# Patient Record
Sex: MALE | Race: BLACK OR AFRICAN AMERICAN | Employment: FULL TIME | URBAN - METROPOLITAN AREA
[De-identification: names, ages, dates, MRNs, and addresses within clinical notes are randomized per-mention and may not be internally consistent; named-entity substitution may affect disease eponyms.]

---

## 2019-03-08 ENCOUNTER — APPOINTMENT (OUTPATIENT)
Dept: PHYSICAL THERAPY | Age: 54
End: 2019-03-08

## 2020-09-09 ENCOUNTER — HOSPITAL ENCOUNTER (OUTPATIENT)
Dept: PHYSICAL THERAPY | Age: 55
Discharge: HOME | End: 2020-09-09

## 2020-09-22 ENCOUNTER — APPOINTMENT (OUTPATIENT)
Dept: PHYSICAL THERAPY | Age: 55
End: 2020-09-22

## 2024-06-27 ENCOUNTER — OFFICE VISIT (OUTPATIENT)
Age: 59
End: 2024-06-27
Payer: COMMERCIAL

## 2024-06-27 VITALS
HEIGHT: 67 IN | BODY MASS INDEX: 35.47 KG/M2 | TEMPERATURE: 97.6 F | OXYGEN SATURATION: 94 % | SYSTOLIC BLOOD PRESSURE: 138 MMHG | HEART RATE: 60 BPM | DIASTOLIC BLOOD PRESSURE: 78 MMHG | WEIGHT: 226 LBS | RESPIRATION RATE: 18 BRPM

## 2024-06-27 DIAGNOSIS — I10 PRIMARY HYPERTENSION: Primary | ICD-10-CM

## 2024-06-27 DIAGNOSIS — M17.0 PRIMARY OSTEOARTHRITIS OF BOTH KNEES: ICD-10-CM

## 2024-06-27 DIAGNOSIS — R06.02 EXERTIONAL SHORTNESS OF BREATH: ICD-10-CM

## 2024-06-27 DIAGNOSIS — R94.31 ABNORMAL ECG: ICD-10-CM

## 2024-06-27 DIAGNOSIS — R07.89 OTHER CHEST PAIN: ICD-10-CM

## 2024-06-27 DIAGNOSIS — R01.1 SYSTOLIC MURMUR: ICD-10-CM

## 2024-06-27 DIAGNOSIS — I51.89 DIASTOLIC DYSFUNCTION: ICD-10-CM

## 2024-06-27 PROCEDURE — 99215 OFFICE O/P EST HI 40 MIN: CPT | Performed by: INTERNAL MEDICINE

## 2024-06-27 PROCEDURE — 3075F SYST BP GE 130 - 139MM HG: CPT | Performed by: INTERNAL MEDICINE

## 2024-06-27 PROCEDURE — 3078F DIAST BP <80 MM HG: CPT | Performed by: INTERNAL MEDICINE

## 2024-06-27 RX ORDER — METOPROLOL SUCCINATE 100 MG/1
100 TABLET, EXTENDED RELEASE ORAL DAILY
COMMUNITY

## 2024-06-27 RX ORDER — LANOLIN ALCOHOL/MO/W.PET/CERES
CREAM (GRAM) TOPICAL
COMMUNITY
Start: 2024-05-24

## 2024-06-27 RX ORDER — GABAPENTIN 300 MG/1
300 CAPSULE ORAL 2 TIMES DAILY
COMMUNITY
Start: 2024-05-22

## 2024-06-27 RX ORDER — METHOCARBAMOL 750 MG/1
750 TABLET, FILM COATED ORAL EVERY 4 HOURS PRN
COMMUNITY
Start: 2017-07-12

## 2024-06-27 RX ORDER — LOSARTAN POTASSIUM 100 MG/1
TABLET ORAL
COMMUNITY
Start: 2024-04-10

## 2024-06-27 RX ORDER — FENOFIBRATE 160 MG/1
160 TABLET ORAL DAILY
COMMUNITY

## 2024-06-27 RX ORDER — AMLODIPINE BESYLATE 10 MG/1
TABLET ORAL
COMMUNITY
Start: 2024-06-04

## 2024-06-27 RX ORDER — HYDROCHLOROTHIAZIDE 12.5 MG/1
12.5 TABLET ORAL DAILY
COMMUNITY
Start: 2024-05-18

## 2024-06-27 RX ORDER — FLUTICASONE PROPIONATE 50 MCG
2 SPRAY, SUSPENSION (ML) NASAL DAILY
COMMUNITY

## 2024-06-27 ASSESSMENT — ENCOUNTER SYMPTOMS
ALLERGIC/IMMUNOLOGIC NEGATIVE: 1
EYES NEGATIVE: 1
GASTROINTESTINAL NEGATIVE: 1
SHORTNESS OF BREATH: 0

## 2024-06-27 NOTE — PROGRESS NOTES
Jamie Gates Jr. (:  1965) is a 58 y.o. male,Established patient, here for evaluation of the following chief complaint(s):  Follow-up    Subjective   SUBJECTIVE/OBJECTIVE:  HPI  Patient presents today as a follow-up patient evaluation. He has a longstanding history of hypertension, which I was asked to evaluate his cardiac status. Patient states he was told in the past he had some type of valvular issue, but I do not have any record of that. Initially, when I started to ask him about symptoms, he stated that he had no chest discomfort nor shortness of breath but, as we started to discuss more, he does mention some shortness of breath with exertion at times, but minimal. He also has had some intermittent chest discomfort which was not severe.  This has not recurred over the last 6 months.  Patient does have bilateral knee pain but does not wish to be considered for surgery at this time.  I told him that his blood pressure readings could be a direct reflection of lack of physical activity, weight gain and sodium intake.  No syncope. No orthopnea. I reviewed his last echo performed in 2022 revealing no significant valvular disease and normal LV function. I was asked to evaluate his cardiac status and make recommendations.           I have carefully reviewed all available medical records, previous office notes, labs, x-rays, and procedure reports    No past medical history on file.     No past surgical history on file.     No Known Allergies     Current Outpatient Medications   Medication Sig Dispense Refill    amLODIPine (NORVASC) 10 MG tablet TAKE 1 TABLET BY MOUTH ONCE A DAY FOR 90 DAYS      vitamin B-12 (CYANOCOBALAMIN) 1000 MCG tablet TAKE 1 tablet Orally Once a day FOR 30 days      fenofibrate (TRIGLIDE) 160 MG tablet Take 1 tablet by mouth daily      fluticasone (FLONASE) 50 MCG/ACT nasal spray 2 sprays by Each Nostril route daily      gabapentin (NEURONTIN) 300 MG capsule Take 1 capsule by

## 2025-03-26 ENCOUNTER — OFFICE VISIT (OUTPATIENT)
Age: 60
End: 2025-03-26
Payer: COMMERCIAL

## 2025-03-26 VITALS
BODY MASS INDEX: 36.44 KG/M2 | WEIGHT: 232.2 LBS | DIASTOLIC BLOOD PRESSURE: 76 MMHG | TEMPERATURE: 97.3 F | SYSTOLIC BLOOD PRESSURE: 123 MMHG | OXYGEN SATURATION: 95 % | HEART RATE: 58 BPM | HEIGHT: 67 IN

## 2025-03-26 DIAGNOSIS — M17.0 PRIMARY OSTEOARTHRITIS OF BOTH KNEES: ICD-10-CM

## 2025-03-26 DIAGNOSIS — I51.89 DIASTOLIC DYSFUNCTION: ICD-10-CM

## 2025-03-26 DIAGNOSIS — I10 PRIMARY HYPERTENSION: Primary | ICD-10-CM

## 2025-03-26 DIAGNOSIS — R06.02 EXERTIONAL SHORTNESS OF BREATH: ICD-10-CM

## 2025-03-26 DIAGNOSIS — R01.1 SYSTOLIC MURMUR: ICD-10-CM

## 2025-03-26 DIAGNOSIS — R07.89 OTHER CHEST PAIN: ICD-10-CM

## 2025-03-26 DIAGNOSIS — R94.31 ABNORMAL ECG: ICD-10-CM

## 2025-03-26 PROCEDURE — 99215 OFFICE O/P EST HI 40 MIN: CPT | Performed by: INTERNAL MEDICINE

## 2025-03-26 PROCEDURE — 3078F DIAST BP <80 MM HG: CPT | Performed by: INTERNAL MEDICINE

## 2025-03-26 PROCEDURE — 93000 ELECTROCARDIOGRAM COMPLETE: CPT | Performed by: INTERNAL MEDICINE

## 2025-03-26 PROCEDURE — 3074F SYST BP LT 130 MM HG: CPT | Performed by: INTERNAL MEDICINE

## 2025-03-26 RX ORDER — LOSARTAN POTASSIUM AND HYDROCHLOROTHIAZIDE 12.5; 1 MG/1; MG/1
1 TABLET ORAL DAILY
COMMUNITY
End: 2025-03-26 | Stop reason: ALTCHOICE

## 2025-03-26 RX ORDER — METHOCARBAMOL 750 MG/1
TABLET ORAL
COMMUNITY

## 2025-03-26 RX ORDER — PSYLLIUM HUSK 3.4 G/7G
1 POWDER ORAL DAILY
COMMUNITY
Start: 2025-02-24

## 2025-03-26 RX ORDER — MECLIZINE HYDROCHLORIDE 25 MG/1
25 TABLET ORAL 3 TIMES DAILY PRN
COMMUNITY
Start: 2023-10-23

## 2025-03-26 RX ORDER — TRIAMCINOLONE ACETONIDE 1 MG/G
CREAM TOPICAL 2 TIMES DAILY
COMMUNITY

## 2025-03-26 ASSESSMENT — PATIENT HEALTH QUESTIONNAIRE - PHQ9
SUM OF ALL RESPONSES TO PHQ QUESTIONS 1-9: 0
SUM OF ALL RESPONSES TO PHQ QUESTIONS 1-9: 0
2. FEELING DOWN, DEPRESSED OR HOPELESS: NOT AT ALL
SUM OF ALL RESPONSES TO PHQ QUESTIONS 1-9: 0
SUM OF ALL RESPONSES TO PHQ QUESTIONS 1-9: 0
1. LITTLE INTEREST OR PLEASURE IN DOING THINGS: NOT AT ALL

## 2025-03-26 ASSESSMENT — ENCOUNTER SYMPTOMS
SHORTNESS OF BREATH: 1
ALLERGIC/IMMUNOLOGIC NEGATIVE: 1
GASTROINTESTINAL NEGATIVE: 1
EYES NEGATIVE: 1

## 2025-03-26 NOTE — PROGRESS NOTES
Jamie Gates Jr. (:  1965) is a 59 y.o. male,Established patient, here for evaluation of the following chief complaint(s):  Follow-up (9months)    Subjective   SUBJECTIVE/OBJECTIVE:  History of Present Illness  The patient presents for evaluation of blood pressure management. He has a longstanding history of hypertension, which I was asked to evaluate his cardiac status. Patient states he was told in the past he had some type of valvular issue, but I do not have any record of that. Patient does have bilateral knee pain but does not wish to be considered for surgery at this time.  I told him that his blood pressure readings could be a direct reflection of lack of physical activity, weight gain and sodium intake. I reviewed his last echo performed in 2022 revealing no significant valvular disease and normal LV function.    Intermittent episodes of lightheadedness and dizziness are reported, though these are not severe. Current medications include losartan and hydrochlorothiazide, administered as a combination therapy. Regular laboratory tests are conducted by his primary care physician to monitor health status. Blood pressure readings are noted to be stable.    An echocardiogram performed approximately three years ago revealed minimal thickening of the heart muscle, and a stress test conducted at the same time was normal. There is no current concern regarding chest pain, shortness of breath, or significant heart-related symptoms.    SOCIAL HISTORY  - Occupations: Works at a school  - Exercise: Engages in some physical activities  I have carefully reviewed all available medical records, previous office notes, lab, x-ray and procedure reports    History reviewed. No pertinent past medical history.     History reviewed. No pertinent surgical history.     Allergies   Allergen Reactions    Fexofenadine Hcl Dizziness or Vertigo        Current Outpatient Medications   Medication Sig Dispense Refill

## 2025-03-26 NOTE — PROGRESS NOTES
1. \"Have you been to the ER, urgent care clinic since your last visit?  Hospitalized since your last visit?\" Reviewed by Dr. Gerald Martinez    2. \"Have you seen or consulted any other health care providers outside of the [facility] since your last visit?\" Reviewed by Dr. Gerald Martinez

## 2025-06-06 ENCOUNTER — TELEPHONE (OUTPATIENT)
Age: 60
End: 2025-06-06